# Patient Record
Sex: FEMALE
[De-identification: names, ages, dates, MRNs, and addresses within clinical notes are randomized per-mention and may not be internally consistent; named-entity substitution may affect disease eponyms.]

---
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
## 2019-02-21 ENCOUNTER — HOSPITAL ENCOUNTER (EMERGENCY)
Dept: HOSPITAL 92 - ERS | Age: 19
LOS: 1 days | Discharge: HOME | End: 2019-02-22
Payer: SELF-PAY

## 2019-02-21 DIAGNOSIS — F17.290: ICD-10-CM

## 2019-02-21 DIAGNOSIS — N89.8: Primary | ICD-10-CM

## 2019-02-21 LAB — SP GR UR STRIP: 1.03 (ref 1–1.04)

## 2019-02-21 PROCEDURE — 99284 EMERGENCY DEPT VISIT MOD MDM: CPT

## 2019-02-21 PROCEDURE — 87660 TRICHOMONAS VAGIN DIR PROBE: CPT

## 2019-02-21 PROCEDURE — 81025 URINE PREGNANCY TEST: CPT

## 2019-02-21 PROCEDURE — 87480 CANDIDA DNA DIR PROBE: CPT

## 2019-02-21 PROCEDURE — 81003 URINALYSIS AUTO W/O SCOPE: CPT

## 2019-02-21 PROCEDURE — 87510 GARDNER VAG DNA DIR PROBE: CPT

## 2019-02-21 PROCEDURE — 87491 CHLMYD TRACH DNA AMP PROBE: CPT

## 2019-02-21 PROCEDURE — 87591 N.GONORRHOEAE DNA AMP PROB: CPT

## 2019-02-22 LAB
PREGU CONTROL BACKGROUND?: (no result)
PREGU CONTROL BAR APPEAR?: YES

## 2019-03-08 ENCOUNTER — HOSPITAL ENCOUNTER (EMERGENCY)
Dept: HOSPITAL 92 - ERS | Age: 19
Discharge: LEFT BEFORE DISCHARGE | End: 2019-03-08
Payer: SELF-PAY

## 2019-03-08 DIAGNOSIS — Z53.21: Primary | ICD-10-CM

## 2019-03-11 ENCOUNTER — HOSPITAL ENCOUNTER (EMERGENCY)
Dept: HOSPITAL 92 - ERS | Age: 19
Discharge: HOME | End: 2019-03-11
Payer: SELF-PAY

## 2019-03-11 DIAGNOSIS — J20.9: Primary | ICD-10-CM

## 2019-03-11 DIAGNOSIS — F32.9: ICD-10-CM

## 2019-03-11 DIAGNOSIS — F41.9: ICD-10-CM

## 2019-03-11 PROCEDURE — 71046 X-RAY EXAM CHEST 2 VIEWS: CPT

## 2019-03-11 NOTE — RAD
PA AND LATERAL CHEST RADIOGRAPH:

 

Date: 3-11-19

 

History: Shortness of breath that started on Friday. 

 

Comparison: 1-29-18

 

FINDINGS: 

Cardiac silhouette and pulmonary vasculature are within normal limits. Lungs are clear. There has bee
n no interval change since prior exam. 

 

IMPRESSION: 

No acute cardiopulmonary process. 

 

POS: [facility]